# Patient Record
Sex: MALE | Race: WHITE | NOT HISPANIC OR LATINO | Employment: UNEMPLOYED | URBAN - METROPOLITAN AREA
[De-identification: names, ages, dates, MRNs, and addresses within clinical notes are randomized per-mention and may not be internally consistent; named-entity substitution may affect disease eponyms.]

---
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
## 2025-06-25 ENCOUNTER — HOSPITAL ENCOUNTER (OUTPATIENT)
Dept: RADIOLOGY | Facility: HOSPITAL | Age: 20
Discharge: HOME | End: 2025-06-25
Attending: FAMILY MEDICINE
Payer: OTHER GOVERNMENT

## 2025-06-25 ENCOUNTER — RESULTS FOLLOW-UP (OUTPATIENT)
Dept: FAMILY MEDICINE | Facility: CLINIC | Age: 20
End: 2025-06-25

## 2025-06-25 ENCOUNTER — OFFICE VISIT (OUTPATIENT)
Dept: FAMILY MEDICINE | Facility: CLINIC | Age: 20
End: 2025-06-25
Payer: OTHER GOVERNMENT

## 2025-06-25 VITALS
SYSTOLIC BLOOD PRESSURE: 130 MMHG | BODY MASS INDEX: 27.55 KG/M2 | OXYGEN SATURATION: 97 % | HEIGHT: 68 IN | DIASTOLIC BLOOD PRESSURE: 58 MMHG | HEART RATE: 66 BPM | WEIGHT: 181.75 LBS | TEMPERATURE: 98 F

## 2025-06-25 DIAGNOSIS — R06.02 SOB (SHORTNESS OF BREATH): Primary | ICD-10-CM

## 2025-06-25 DIAGNOSIS — J45.20 INTERMITTENT ASTHMA WITHOUT COMPLICATION, UNSPECIFIED ASTHMA SEVERITY: ICD-10-CM

## 2025-06-25 DIAGNOSIS — R06.02 SOB (SHORTNESS OF BREATH): ICD-10-CM

## 2025-06-25 PROBLEM — S49.91XA SHOULDER INJURY, RIGHT, INITIAL ENCOUNTER: Status: RESOLVED | Noted: 2021-04-26 | Resolved: 2025-06-25

## 2025-06-25 PROBLEM — L98.8 PILONIDAL DISEASE: Status: RESOLVED | Noted: 2023-01-31 | Resolved: 2025-06-25

## 2025-06-25 PROBLEM — R29.898 DECREASED STRENGTH OF LOWER EXTREMITY: Status: RESOLVED | Noted: 2024-03-20 | Resolved: 2025-06-25

## 2025-06-25 PROBLEM — R09.1 PLEURISY: Status: RESOLVED | Noted: 2019-12-27 | Resolved: 2025-06-25

## 2025-06-25 PROBLEM — M25.512 ACUTE PAIN OF LEFT SHOULDER: Status: RESOLVED | Noted: 2021-04-26 | Resolved: 2025-06-25

## 2025-06-25 PROBLEM — M43.6 NECK STIFFNESS: Status: RESOLVED | Noted: 2021-04-26 | Resolved: 2025-06-25

## 2025-06-25 PROCEDURE — 99204 OFFICE O/P NEW MOD 45 MIN: CPT | Mod: S$PBB,,, | Performed by: FAMILY MEDICINE

## 2025-06-25 PROCEDURE — 71046 X-RAY EXAM CHEST 2 VIEWS: CPT | Mod: 26,,, | Performed by: RADIOLOGY

## 2025-06-25 PROCEDURE — 99999 PR PBB SHADOW E&M-EST. PATIENT-LVL III: CPT | Mod: PBBFAC,,, | Performed by: FAMILY MEDICINE

## 2025-06-25 PROCEDURE — 94640 AIRWAY INHALATION TREATMENT: CPT | Mod: PBBFAC,PO

## 2025-06-25 PROCEDURE — 99999PBSHW PR PBB SHADOW TECHNICAL ONLY FILED TO HB: Mod: PBBFAC,,,

## 2025-06-25 PROCEDURE — 99213 OFFICE O/P EST LOW 20 MIN: CPT | Mod: PBBFAC,PO | Performed by: FAMILY MEDICINE

## 2025-06-25 PROCEDURE — 71046 X-RAY EXAM CHEST 2 VIEWS: CPT | Mod: TC,FY,PO

## 2025-06-25 RX ORDER — IPRATROPIUM BROMIDE AND ALBUTEROL SULFATE 2.5; .5 MG/3ML; MG/3ML
3 SOLUTION RESPIRATORY (INHALATION)
Status: COMPLETED | OUTPATIENT
Start: 2025-06-25 | End: 2025-06-25

## 2025-06-25 RX ORDER — METHYLPREDNISOLONE 4 MG/1
TABLET ORAL
Qty: 21 EACH | Refills: 0 | Status: SHIPPED | OUTPATIENT
Start: 2025-06-25 | End: 2025-07-16

## 2025-06-25 RX ORDER — ALBUTEROL SULFATE 90 UG/1
2 INHALANT RESPIRATORY (INHALATION) EVERY 6 HOURS PRN
Qty: 18 G | Refills: 3 | Status: SHIPPED | OUTPATIENT
Start: 2025-06-25 | End: 2026-06-25

## 2025-06-25 RX ADMIN — IPRATROPIUM BROMIDE AND ALBUTEROL SULFATE 3 ML: .5; 3 SOLUTION RESPIRATORY (INHALATION) at 09:06

## 2025-06-25 NOTE — PROGRESS NOTES
[facility]  Progress Note    SUBJECTIVE:     Chief Complaint   Patient presents with    Shortness of Breath     3 months sob       HPI   Poppy Mancera  is a 19 y.o. male with history of asthma, here for c/o SOB for the past 3 months. Seems to be worsening. Hasn't needed to use an inhaler since 9-10 years ago. Exertion makes it worst. Doesn't smoke/vape. 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 No fevers, chills. Patient has no other new complaints/problems at this time.      Review of patient's allergies indicates:  No Known Allergies    Past Medical History:   Diagnosis Date    Allergy     milk    Asthma     Dental cavities     Learning difficulty     reading and english     Past Surgical History:   Procedure Laterality Date    PILONIDAL CYST DRAINAGE N/A 2/7/2023    Procedure: INCISION AND DRAINAGE, PILONIDAL CYST;  Surgeon: Aditya Beck MD;  Location: [facility] OR [address] [place] [address];  Service: Pediatrics;  Laterality: N/A;  pilonidal wound     Family History   Problem Relation Name Age of Onset    Otitis media Brother      Diabetes Maternal Grandmother      Hypertension Maternal Grandmother      Stroke Maternal Grandmother      Cataracts Maternal Grandmother      Heart disease Maternal Grandfather      Hypertension Maternal Grandfather      Diabetes Maternal Grandfather      Lupus Paternal Grandmother      Heart disease Paternal Grandmother      Otitis media Paternal Grandmother      Asthma Maternal Uncle      Thyroid disease Neg Hx      Strabismus Neg Hx      Retinal detachment Neg Hx      Macular degeneration Neg Hx      Glaucoma Neg Hx      Blindness Neg Hx      Amblyopia Neg Hx       Social History[1]     Review of Systems   Constitutional:  Negative for chills and fever.   HENT: Negative.  Negative for congestion.    Respiratory:  Positive for shortness of breath. Negative for cough.    Cardiovascular: Negative.  Negative for chest pain.   Gastrointestinal: Negative.  Negative for abdominal pain, nausea  and vomiting.   Genitourinary: Negative.    Neurological:  Negative for headaches.   All other systems reviewed and are negative.    OBJECTIVE:     Vitals:    06/25/25 0908   BP: (!) 130/58   Pulse: 66   Temp: 98.1 °F (36.7 °C)     Body mass index is 27.64 kg/m².    Physical Exam  Constitutional:       General: He is not in acute distress.     Appearance: He is not diaphoretic.   HENT:      Head: Normocephalic and atraumatic.      Nose: Nose normal.   Eyes:      Conjunctiva/sclera: Conjunctivae normal.   Cardiovascular:      Rate and Rhythm: Normal rate and regular rhythm.      Heart sounds: Normal heart sounds. No murmur heard.     No friction rub. No gallop.   Pulmonary:      Effort: Pulmonary effort is normal. No respiratory distress.      Breath sounds: No wheezing or rales.      Comments: + diminished breath sounds b/l  Abdominal:      Palpations: Abdomen is soft.   Skin:     General: Skin is warm.   Neurological:      Mental Status: He is alert and oriented to person, place, and time.         Old records were reviewed. Labs and/or images were independently reviewed.    ASSESSMENT     1. SOB (shortness of breath)    2. Intermittent asthma without complication, unspecified asthma severity        PLAN:     SOB (shortness of breath)/Asthma Exacerbation  -     albuterol-ipratropium 2.5 mg-0.5 mg/3 mL nebulizer solution 3 mL  -     X-Ray Chest PA And Lateral; Future; Expected date: 06/25/2025  -     will need CXR, and PFTs. Use albuterol as directed. Will consider maintenance inhaler pending PFTs. Will try trial of oral steroids today as well.    Intermittent asthma without complication, unspecified asthma severity  -     albuterol (VENTOLIN HFA) 90 mcg/actuation inhaler; Inhale 2 puffs into the lungs every 6 (six) hours as needed for Wheezing. Rescue  Dispense: 18 g; Refill: 3  -     Complete PFT with bronchodilator; Future  -     PULSE OXIMETRY WITH REST - PULM; Future  -     use albuterol as directed.         RTC  ILIA Franklin MD  06/25/2025 9:27 AM           [1]   Social History  Tobacco Use    Smoking status: Passive Smoke Exposure - Never Smoker    Smokeless tobacco: Never   Substance Use Topics    Alcohol use: Not Currently    Drug use: Not Currently

## (undated) DEVICE — TRAY SKIN SCRUB WET PREMIUM

## (undated) DEVICE — GOWN POLY REINF BRTH SLV XL

## (undated) DEVICE — SPONGE DERMACEA GAUZE 4X4

## (undated) DEVICE — GAUZE SPONGE PEANUT STRL

## (undated) DEVICE — SKINMARKER & RULER REGULAR X-F

## (undated) DEVICE — SUT 3-0 VICRYL / RB-1

## (undated) DEVICE — NDL STRAIGHT 4CM LEIBINGER

## (undated) DEVICE — SUT MONOCRYL 5-0 P-3 UND 18

## (undated) DEVICE — SEE MEDLINE ITEM 154981

## (undated) DEVICE — SHEET EENT SPLIT

## (undated) DEVICE — ELECTRODE REM PLYHSV RETURN 9

## (undated) DEVICE — GOWN SURGICAL X-LARGE

## (undated) DEVICE — BLADE SURG #15 CARBON STEEL

## (undated) DEVICE — NDL 27G X 1 1/4

## (undated) DEVICE — TRAY MINOR GEN SURG

## (undated) DEVICE — SUT 4-0 CHROMIC GUT / RB1

## (undated) DEVICE — SEE MEDLINE ITEM 157128

## (undated) DEVICE — BRIEF MESH LARGE

## (undated) DEVICE — DRAPE PED LAP SURG 108X77IN

## (undated) DEVICE — ELECTRODE NEEDLE 2.8IN

## (undated) DEVICE — PAD ABD 8X10 STERILE

## (undated) DEVICE — TRAY MINOR GEN SURG OMC

## (undated) DEVICE — PAD GROUNDING NEONATE 6-30LBS